# Patient Record
Sex: FEMALE | Race: WHITE
[De-identification: names, ages, dates, MRNs, and addresses within clinical notes are randomized per-mention and may not be internally consistent; named-entity substitution may affect disease eponyms.]

---

## 2022-05-05 LAB
BUN BLD-MCNC: 13 MG/DL (ref 7–18)
GLUCOSE SERPLBLD-MCNC: 81 MG/DL (ref 74–106)
HCT VFR BLD CALC: 38.7 % (ref 36–45)
LYMPHOCYTES # SPEC AUTO: 1.6 K/UL (ref 0.7–4.9)
PMV BLD: 8.6 FL (ref 7.6–11.3)
POTASSIUM SERPL-SCNC: 3.8 MMOL/L (ref 3.5–5.1)
RBC # BLD: 3.93 M/UL (ref 3.86–4.86)

## 2022-05-05 NOTE — EKG
Test Date:    2022-05-05               Test Time:    07:52:04

Technician:   ROBIN                                     

                                                     

MEASUREMENT RESULTS:                                       

Intervals:                                           

Rate:         74                                     

MS:           158                                    

QRSD:         144                                    

QT:           432                                    

QTc:          479                                    

Axis:                                                

P:            59                                     

MS:           158                                    

QRS:          -15                                    

T:            169                                    

                                                     

INTERPRETIVE STATEMENTS:                                       

                                                     

Normal sinus rhythm with sinus arrhythmia

Left bundle branch block

Abnormal ECG

No previous ECG available for comparison



Electronically Signed On 05-05-22 14:11:17 CDT by Oleksandr Samayoa

## 2022-05-06 ENCOUNTER — HOSPITAL ENCOUNTER (OUTPATIENT)
Dept: HOSPITAL 97 - OR | Age: 72
Discharge: HOME | End: 2022-05-06
Attending: SURGERY
Payer: COMMERCIAL

## 2022-05-06 VITALS — TEMPERATURE: 97.7 F | OXYGEN SATURATION: 97 %

## 2022-05-06 VITALS — SYSTOLIC BLOOD PRESSURE: 154 MMHG | DIASTOLIC BLOOD PRESSURE: 72 MMHG

## 2022-05-06 DIAGNOSIS — Z20.822: ICD-10-CM

## 2022-05-06 DIAGNOSIS — K21.9: Primary | ICD-10-CM

## 2022-05-06 DIAGNOSIS — K21.00: ICD-10-CM

## 2022-05-06 DIAGNOSIS — K29.50: ICD-10-CM

## 2022-05-06 DIAGNOSIS — K22.81: ICD-10-CM

## 2022-05-06 DIAGNOSIS — Z87.19: ICD-10-CM

## 2022-05-06 PROCEDURE — 0DB38ZX EXCISION OF LOWER ESOPHAGUS, VIA NATURAL OR ARTIFICIAL OPENING ENDOSCOPIC, DIAGNOSTIC: ICD-10-PCS

## 2022-05-06 PROCEDURE — 0DB98ZX EXCISION OF DUODENUM, VIA NATURAL OR ARTIFICIAL OPENING ENDOSCOPIC, DIAGNOSTIC: ICD-10-PCS

## 2022-05-06 PROCEDURE — 36415 COLL VENOUS BLD VENIPUNCTURE: CPT

## 2022-05-06 PROCEDURE — 93005 ELECTROCARDIOGRAM TRACING: CPT

## 2022-05-06 PROCEDURE — 0DB78ZX EXCISION OF STOMACH, PYLORUS, VIA NATURAL OR ARTIFICIAL OPENING ENDOSCOPIC, DIAGNOSTIC: ICD-10-PCS

## 2022-05-06 PROCEDURE — 88313 SPECIAL STAINS GROUP 2: CPT

## 2022-05-06 PROCEDURE — 80048 BASIC METABOLIC PNL TOTAL CA: CPT

## 2022-05-06 PROCEDURE — 85025 COMPLETE CBC W/AUTO DIFF WBC: CPT

## 2022-05-06 PROCEDURE — 88312 SPECIAL STAINS GROUP 1: CPT

## 2022-05-06 PROCEDURE — 88305 TISSUE EXAM BY PATHOLOGIST: CPT

## 2022-05-06 PROCEDURE — 43251 EGD REMOVE LESION SNARE: CPT

## 2022-05-06 PROCEDURE — 0DB58ZX EXCISION OF ESOPHAGUS, VIA NATURAL OR ARTIFICIAL OPENING ENDOSCOPIC, DIAGNOSTIC: ICD-10-PCS

## 2022-05-06 PROCEDURE — 43239 EGD BIOPSY SINGLE/MULTIPLE: CPT
